# Patient Record
(demographics unavailable — no encounter records)

---

## 2024-11-05 NOTE — HISTORY OF PRESENT ILLNESS
[Pain Location] : pain [Stable] : stable [5] : a current pain level of 5/10 [Walking] : worsened by walking [Rest] : relieved by rest [de-identified] : 63  year old female presents for evaluation of bilateral knee pain , left worse then right She had left knee MRI done which showed severe cartilage wear.  she has long standing b/l knee pain. pt had cortisone injection and they provided a good pain relief. Patient is aware she is a future candidate for knee replacement she is likely pursue left side first  she is interested in repeat cortisone injection in both knees before her trip to [place]

## 2024-11-05 NOTE — DISCUSSION/SUMMARY
[de-identified] : Medication risks reviewed. Surgical risks reviewed. 63 year old female presents with advanced medial compartmental osteoarthritis of both knees.  Patient underwent left knee MRI on Corina 10, 2024 which showed full-thickness cartilage loss . the nature of her condition and treatment options were discussed in detail. The pt is a future candidate for a staged bilateral TKA. We discussed treatment options including surgery. I recommended continuing with conservative treatment at this time. She elected to receive bilateral knee repeat cortisone injection today, which she tolerated well. The pt will f/u in 3 months for repeat injections if needed.  The patient is a 63 year individual with end stage arthritis of their b/l knee joint. Based upon the patient's continued symptoms and failure to respond to conservative treatment (including HA injections, cortisone injections, over the counter medications, and PT) I have recommended a b/l total knee arthroplasty for this patient. A long discussion took place with the patient describing what a total joint replacement is and what the procedure would entail. A total knee arthroplasty model, similar to the implant that was used during the operation, was utilized to demonstrate and to discuss the various bearing surfaces of the implants. The hospitalization and post-operative care and rehabilitation were also discussed. The use of perioperative antibiotics and DVT prophylaxis were discussed. The risk, benefits and alternatives to a surgical intervention were discussed at length with the patient. The patient was also advised of risks related to the medical comorbidities, elevated body mass index (BMI), and smoking where applicable. We discussed how to reduce modifiable risk factors and encouraged smoking cessation were applicable.. A lengthy discussion took place to review the most common complications including but not limited to: deep vein thrombosis, pulmonary embolus, heart attack, stroke, infection, wound breakdown, numbness, damage to nerves, tendon, muscles, arteries or other blood vessels, death and other possible complications from anesthesia. The patient was told that we will take steps to minimize these risks by using sterile technique, antibiotics and DVT prophylaxis when appropriate and follow the patient postoperatively in the office setting to monitor progress. The possibility of recurrent pain, no improvement in pain and actual worsening of pain were also discussed with the patient. The discharge plan of care focused on the patient going home following surgery. The patient was encouraged to make the necessary arrangements to have someone stay with them when they are discharged home. Following discharge, a home care nurse was to the patient. The home care nurse would open the patients home care case and request home physical therapy services. Home physical therapy was to commence following discharge provided it was appropriate and covered by the health insurance benefit plan. The benefits of surgery were discussed with the patient including the potential for improving her current clinical condition through operative intervention. Alternatives to surgical intervention including continued conservative management were also discussed in detail. All questions were answered to the satisfaction of the patient. The treatment plan of care, as well as a model of a total knee arthroplasty equivalent to the one that will be used for their total joint replacement, was shared with the patient. The patient agreed to the plan of care as well as the use of implants in their total joint replacement.

## 2024-11-05 NOTE — PHYSICAL EXAM
[LE] : Sensory: Intact in bilateral lower extremities [ALL] : dorsalis pedis, posterior tibial, femoral, popliteal, and radial 2+ and symmetric bilaterally [Antalgic] : not antalgic [de-identified] : GENERAL APPEARANCE: Well nourished and hydrated, pleasant, alert, and oriented x 3. Appears their stated age. \par  HEENT: Normocephalic, extraocular eye motion intact. Nasal septum midline. Oral cavity clear. External auditory canal clear. \par  RESPIRATORY: Breath sounds clear and audible in all lobes. No wheezing, No accessory muscle use.\par  CARDIOVASCULAR: No apparent abnormalities. No lower leg edema. No varicosities. Pedal pulses are palpable.\par  NEUROLOGIC: Sensation is normal, no muscle weakness in the upper or lower extremities.\par  DERMATOLOGIC: No apparent skin lesions, moist, warm, no rash.\par  SPINE: Cervical spine appears normal and moves freely; thoracic spine appears normal and moves freely; lumbosacral spine appears normal and moves freely, normal, nontender.\par  MUSCULOSKELETAL: Hands, wrists, and elbows are normal and move freely, shoulders are normal and move freely.  [de-identified] : Examination of bilateral knees shows medial joint line tenderness, pain and crepitus with ROM, neutral alignment.  5/5 motor strength in bilateral lower extremities. Sensory: Intact in bilateral lower extremities. DTRs: Biceps, brachioradialis, triceps, patellar, ankle and plantar 2+ and symmetric bilaterally. Pulses: dorsalis pedis, posterior tibial, femoral, popliteal, and radial 2+ and symmetric bilaterally.   b/l hip examination shows preserved ROM without severe pain. she has moderate tenderness and pain with palpation of b/l greater trochanteric bursa.

## 2024-11-05 NOTE — PROCEDURE
[de-identified] : I injected the patient's bilateral knees today with cortisone.\par  \par  I discussed at length with the patient the planned steroid and lidocaine injection. The risks, benefits, convalescence and alternatives were reviewed. The possible side effects discussed included but were not limited to: pain, swelling, heat, bleeding, and redness. Symptoms are generally mild but if they are extensive then contact the office. Giving pain relievers by mouth such as NSAIDs or Tylenol can generally treat the reactions to steroid and lidocaine. Rare cases of infection have been noted. Rash, hives and itching may occur post injection. If you have muscle pain or cramps, flushing and or swelling of the face, rapid heart beat, nausea, dizziness, fever, chills, headache, difficulty breathing, swelling in the arms or legs, or have a prickly feeling of your skin, contact a health care provider immediately. Following this discussion, the knee was prepped with Alcohol and under sterile condition the 80 mg Depo-Medrol and 6 cc Lidocaine injection was performed with a 20 gauge needle through a superolateral injection site. The needle was introduced into the joint, aspiration was performed to ensure intra-articular placement and the medication was injected. Upon withdrawal of the needle the site was cleaned with alcohol and a band aid applied. The patient tolerated the injection well and there were no adverse effects. Post injection instructions included no strenuous activity for 24 hours, cryotherapy and if there are any adverse effects to contact the office.

## 2025-01-29 NOTE — CONSULT LETTER
[Dear  ___] : Dear  [unfilled], [Consult Letter:] : I had the pleasure of evaluating your patient, [unfilled]. [Please see my note below.] : Please see my note below. [Consult Closing:] : Thank you very much for allowing me to participate in the care of this patient.  If you have any questions, please do not hesitate to contact me. [Sincerely,] : Sincerely, [FreeTextEntry3] : Myron Myron I. Kleiner, M.D., FACR   Chief, Division of Rheumatology  Department of Medicine   [facility]   Chief, Division of Rheumatology  Department of Medicine   [facility]

## 2025-01-29 NOTE — CONSULT LETTER
[Dear  ___] : Dear  [unfilled], [Consult Letter:] : I had the pleasure of evaluating your patient, [unfilled]. [Please see my note below.] : Please see my note below. [Consult Closing:] : Thank you very much for allowing me to participate in the care of this patient.  If you have any questions, please do not hesitate to contact me. [Sincerely,] : Sincerely, [FreeTextEntry3] : Myron Myron I. Kleiner, M.D., FACR   Chief, Division of Rheumatology  Department of Medicine   [facility]   Chief, Division of Rheumatology  Department of Medicine   [facility]

## 2025-01-29 NOTE — ADDENDUM
[FreeTextEntry1] :  I, Navdeep Mccurdy, acted solely as a scribe for Dr. Myron I. Kleiner, MD. on 01/28/2025. I personally performed the services described in the documentation, reviewed the documentation recorded by the scribe in my presence, and it accurately and completely records my words and actions.

## 2025-01-29 NOTE — ASSESSMENT
[FreeTextEntry1] : Impression: NITO PARRY is a 63 year old  woman who was referred for further evaluation of joint symptoms and rheumatic diseases.  6 months ago pt developed increased pain b/l shoulders, left wrist, base of both thumbs, and neck with radiation to b/l wrists with  paresthesias.  Denies joint swelling, erythema and heat. Pt denies worse in the morning. Morning stiffness lasting 1 hour. Recent sleep disturbance and fatigue, with snoring, consider fibromyalgia. Pt notes she went to Orthopedic Dr. Valera who performed MRI and treated with Meloxicam 15 mg q.d with some relief and steroid injection to left shoulder with transient relief. Pt was then referred by another pt to see Rheumatologist.  Pt also notes a greater than 10 year history of intermittent pain of b/l knees, dorsa both feet, and arch of both feet. Pt also notes history of low back pain without radiation. Pt on her own would take Aleve 440 mg q.d 3-4x a week with little relief. 9 months ago pt performed MRI of left knee which revealed meniscal tear -- treated by Orthopedic Dr. Somers who performed steroid injection with transient relief. No rash, oral/genital ulcers, alopecia, chest pain, fever, Raynaud's, or other joint symptoms. Pt denies history of dry eye or dry mouth. On PE pt had TMJ left tenderness, tender bilateral bicipital tendons, tender b/l deltoids, anserine bursitis, bicipital tendonitis, deltoid tendonitis, all contributing to her joint pain. Denies recent headache, jaw claudication, or visual symptoms. Pt notes recent cardio exercise via walking for 30 minutes 5 days a week.  Pt notes she performed bone densitometry one year ago which revealed osteopenia, which was treated with Calcium 500mg b.i.d and multivitamin. Pt went through menopause at 52 years old, and denies receiving HRT. Pt notes her mother and sisters have history of osteoarthritis. Pt was referred for further evaluation of joint symptoms and rheumatic diseases. I will evaluate for various types of rheumatic diseases.   Plan: I reviewed patients chart and previous records with extensive discussion Laboratory tests ordered today - see list below - with coordination of care X-rays ordered - see list below - with coordination of care Obtain recent bone densitometry -- with coordination of care Diagnosis and prognosis discussed Continue current medications (other than those changed below)  Etodolac  mg b.i.d. end of breakfast and supper (Possible side effects explained including cardiovascular risk/MI/CVA)  Omeprazole 20 mg q.d. a.c. breakfast (possible side effects explained)   Prophylactic aspirin 81 mg q.d. (end of lunch)  (Possible side effects explained)   Cyclobenzaprine 10 mg one half tab q.d. at supper time; if no better/side effects 7 days, increase to 10 mg q.d. (possible side effects explained)  Daily exercise starting at 10 minutes per day, gradually increasing to at least 30 minutes per day--emphasized  Return visit 2-3 weeks All questions and concerns were addressed

## 2025-01-29 NOTE — REASON FOR VISIT
[Consultation] : a consultation visit [FreeTextEntry1] :  who was referred for further evaluation of joint symptoms and rheumatic diseases.

## 2025-01-29 NOTE — PHYSICAL EXAM
[General Appearance - Alert] : alert [General Appearance - In No Acute Distress] : in no acute distress [General Appearance - Well Nourished] : well nourished [General Appearance - Well-Appearing] : healthy appearing [General Appearance - Well Developed] : well developed [Sclera] : the sclera and conjunctiva were normal [PERRL With Normal Accommodation] : pupils were equal in size, round, and reactive to light [Extraocular Movements] : extraocular movements were intact [Outer Ear] : the ears and nose were normal in appearance [Oropharynx] : the oropharynx was normal [Neck Appearance] : the appearance of the neck was normal [Neck Cervical Mass (___cm)] : no neck mass was observed [Jugular Venous Distention Increased] : there was no jugular-venous distention [Thyroid Diffuse Enlargement] : the thyroid was not enlarged [Lungs Percussion] : the lungs were normal to percussion [Heart Rate And Rhythm] : heart rate was normal and rhythm regular [Edema] : there was no peripheral edema [Abdomen Soft] : soft [Abdomen Tenderness] : non-tender [Abdomen Mass (___ Cm)] : no abdominal mass palpated [Cervical Lymph Nodes Enlarged Posterior Bilaterally] : posterior cervical [Cervical Lymph Nodes Enlarged Anterior Bilaterally] : anterior cervical [Supraclavicular Lymph Nodes Enlarged Bilaterally] : supraclavicular [Axillary Lymph Nodes Enlarged Bilaterally] : axillary [No CVA Tenderness] : no ~M costovertebral angle tenderness [No Spinal Tenderness] : no spinal tenderness [Skin Color & Pigmentation] : normal skin color and pigmentation [Skin Turgor] : normal skin turgor [] : no rash [Cranial Nerves] : cranial nerves 2-12 were intact [Sensation] : the sensory exam was normal to light touch and pinprick [Motor Exam] : the motor exam was normal [No Focal Deficits] : no focal deficits [Oriented To Time, Place, And Person] : oriented to person, place, and time [Impaired Insight] : insight and judgment were intact [Affect] : the affect was normal [Mood] : the mood was normal [FreeTextEntry1] :  Strength-5/5

## 2025-01-29 NOTE — HISTORY OF PRESENT ILLNESS
[FreeTextEntry1] : NITO PARRY is a 63 year old  woman who was referred for further evaluation of joint symptoms and rheumatic diseases.  6 months ago pt developed increased pain b/l shoulders, left wrist, base of both thumbs, and neck with radiation to b/l wrists with  paresthesias.  Denies joint swelling, erythema and heat. Pt denies worse in the morning. Morning stiffness lasting 1 hour. Recent sleep disturbance and fatigue, with snoring. Pt notes she went to Orthopedic Dr. Valera who performed MRI and treated with Meloxicam 15 mg q.d with some relief and steroid injection to left shoulder with transient relief. Pt was then referred by another pt to see Rheumatologist.  Pt also notes a greater than 10 year history of intermittent pain of b/l knees, dorsa both feet, and arch of both feet. Pt also notes history of low back pain without radiation.  Pt on her own would take Aleve 440 mg q.d 3-4x a week with little relief. 9 months ago pt performed MRI of left knee which revealed meniscal tear -- treated by Orthopedic Dr. Somers who performed steroid injection with transient relief. No rash, oral/genital ulcers, alopecia, chest pain, fever, Raynaud's, or other joint symptoms. Pt denies history of dry eye or dry mouth. Recent cardio exercise via walking for 30 minutes 5 days a week. Pt notes she performed bone densitometry one year ago which revealed osteopenia, which was treated with Calcium 500mg b.i.d and multivitamin. Pt went through menopause at 52 years old, and denies receiving HRT. Pt notes her mother and sisters have history of osteoarthritis. Pt was referred for further evaluation of joint symptoms and rheumatic diseases.

## 2025-02-21 NOTE — PROCEDURE
[de-identified] : I injected the patient's bilateral knees today with cortisone.\par  \par  I discussed at length with the patient the planned steroid and lidocaine injection. The risks, benefits, convalescence and alternatives were reviewed. The possible side effects discussed included but were not limited to: pain, swelling, heat, bleeding, and redness. Symptoms are generally mild but if they are extensive then contact the office. Giving pain relievers by mouth such as NSAIDs or Tylenol can generally treat the reactions to steroid and lidocaine. Rare cases of infection have been noted. Rash, hives and itching may occur post injection. If you have muscle pain or cramps, flushing and or swelling of the face, rapid heart beat, nausea, dizziness, fever, chills, headache, difficulty breathing, swelling in the arms or legs, or have a prickly feeling of your skin, contact a health care provider immediately. Following this discussion, the knee was prepped with Alcohol and under sterile condition the 80 mg Depo-Medrol and 6 cc Lidocaine injection was performed with a 20 gauge needle through a superolateral injection site. The needle was introduced into the joint, aspiration was performed to ensure intra-articular placement and the medication was injected. Upon withdrawal of the needle the site was cleaned with alcohol and a band aid applied. The patient tolerated the injection well and there were no adverse effects. Post injection instructions included no strenuous activity for 24 hours, cryotherapy and if there are any adverse effects to contact the office.

## 2025-02-21 NOTE — DISCUSSION/SUMMARY
[de-identified] : Medication risks reviewed. Surgical risks reviewed. 63 year old female presents with advanced medial compartmental osteoarthritis of both knees.  Patient underwent left knee MRI on Corina 10, 2024 which showed full-thickness cartilage loss . the nature of her condition and treatment options were discussed in detail. The pt is a future candidate for a staged bilateral TKA. We discussed treatment options including surgery. I recommended continuing with conservative treatment at this time. She elected to receive bilateral knee repeat cortisone injection today, which she tolerated well. The pt will f/u in 3 months for repeat injections if needed.  The patient is a 63 year individual with end stage arthritis of their b/l knee joint. Based upon the patient's continued symptoms and failure to respond to conservative treatment (including HA injections, cortisone injections, over the counter medications, and PT) I have recommended a b/l total knee arthroplasty for this patient. A long discussion took place with the patient describing what a total joint replacement is and what the procedure would entail. A total knee arthroplasty model, similar to the implant that was used during the operation, was utilized to demonstrate and to discuss the various bearing surfaces of the implants. The hospitalization and post-operative care and rehabilitation were also discussed. The use of perioperative antibiotics and DVT prophylaxis were discussed. The risk, benefits and alternatives to a surgical intervention were discussed at length with the patient. The patient was also advised of risks related to the medical comorbidities, elevated body mass index (BMI), and smoking where applicable. We discussed how to reduce modifiable risk factors and encouraged smoking cessation were applicable.. A lengthy discussion took place to review the most common complications including but not limited to: deep vein thrombosis, pulmonary embolus, heart attack, stroke, infection, wound breakdown, numbness, damage to nerves, tendon, muscles, arteries or other blood vessels, death and other possible complications from anesthesia. The patient was told that we will take steps to minimize these risks by using sterile technique, antibiotics and DVT prophylaxis when appropriate and follow the patient postoperatively in the office setting to monitor progress. The possibility of recurrent pain, no improvement in pain and actual worsening of pain were also discussed with the patient. The discharge plan of care focused on the patient going home following surgery. The patient was encouraged to make the necessary arrangements to have someone stay with them when they are discharged home. Following discharge, a home care nurse was to the patient. The home care nurse would open the patients home care case and request home physical therapy services. Home physical therapy was to commence following discharge provided it was appropriate and covered by the health insurance benefit plan. The benefits of surgery were discussed with the patient including the potential for improving her current clinical condition through operative intervention. Alternatives to surgical intervention including continued conservative management were also discussed in detail. All questions were answered to the satisfaction of the patient. The treatment plan of care, as well as a model of a total knee arthroplasty equivalent to the one that will be used for their total joint replacement, was shared with the patient. The patient agreed to the plan of care as well as the use of implants in their total joint replacement.

## 2025-02-21 NOTE — HISTORY OF PRESENT ILLNESS
[Pain Location] : pain [Stable] : stable [5] : a current pain level of 5/10 [Walking] : worsened by walking [Rest] : relieved by rest [de-identified] : 63  year old female presents for evaluation of bilateral knee pain , left worse then right She had left knee MRI done which showed severe cartilage wear.  she has long standing b/l knee pain. pt had cortisone injection and they provided a good pain relief. She comes in today for repeat bilateral cortisone injections and reports she did well with her last set.

## 2025-02-21 NOTE — PHYSICAL EXAM
[LE] : Sensory: Intact in bilateral lower extremities [ALL] : dorsalis pedis, posterior tibial, femoral, popliteal, and radial 2+ and symmetric bilaterally [Antalgic] : not antalgic [de-identified] : GENERAL APPEARANCE: Well nourished and hydrated, pleasant, alert, and oriented x 3. Appears their stated age. \par  HEENT: Normocephalic, extraocular eye motion intact. Nasal septum midline. Oral cavity clear. External auditory canal clear. \par  RESPIRATORY: Breath sounds clear and audible in all lobes. No wheezing, No accessory muscle use.\par  CARDIOVASCULAR: No apparent abnormalities. No lower leg edema. No varicosities. Pedal pulses are palpable.\par  NEUROLOGIC: Sensation is normal, no muscle weakness in the upper or lower extremities.\par  DERMATOLOGIC: No apparent skin lesions, moist, warm, no rash.\par  SPINE: Cervical spine appears normal and moves freely; thoracic spine appears normal and moves freely; lumbosacral spine appears normal and moves freely, normal, nontender.\par  MUSCULOSKELETAL: Hands, wrists, and elbows are normal and move freely, shoulders are normal and move freely.  [de-identified] : Examination of bilateral knees shows medial joint line tenderness, pain and crepitus with ROM, neutral alignment.  5/5 motor strength in bilateral lower extremities. Sensory: Intact in bilateral lower extremities. DTRs: Biceps, brachioradialis, triceps, patellar, ankle and plantar 2+ and symmetric bilaterally. Pulses: dorsalis pedis, posterior tibial, femoral, popliteal, and radial 2+ and symmetric bilaterally.   b/l hip examination shows preserved ROM without severe pain. she has moderate tenderness and pain with palpation of b/l greater trochanteric bursa.

## 2025-04-24 NOTE — REASON FOR VISIT
[Follow-Up Visit] : a follow-up visit for [Knee Pain] : knee pain [Other: ____] : [unfilled] [FreeTextEntry2] : bilateral knee pain

## 2025-04-24 NOTE — PHYSICAL EXAM
[LE] : Sensory: Intact in bilateral lower extremities [ALL] : dorsalis pedis, posterior tibial, femoral, popliteal, and radial 2+ and symmetric bilaterally [Antalgic] : not antalgic [de-identified] : GENERAL APPEARANCE: Well nourished and hydrated, pleasant, alert, and oriented x 3. Appears their stated age. \par  HEENT: Normocephalic, extraocular eye motion intact. Nasal septum midline. Oral cavity clear. External auditory canal clear. \par  RESPIRATORY: Breath sounds clear and audible in all lobes. No wheezing, No accessory muscle use.\par  CARDIOVASCULAR: No apparent abnormalities. No lower leg edema. No varicosities. Pedal pulses are palpable.\par  NEUROLOGIC: Sensation is normal, no muscle weakness in the upper or lower extremities.\par  DERMATOLOGIC: No apparent skin lesions, moist, warm, no rash.\par  SPINE: Cervical spine appears normal and moves freely; thoracic spine appears normal and moves freely; lumbosacral spine appears normal and moves freely, normal, nontender.\par  MUSCULOSKELETAL: Hands, wrists, and elbows are normal and move freely, shoulders are normal and move freely.  [de-identified] : Examination of right knees shows medial joint line tenderness, pain and crepitus with ROM, neutral alignment.  5/5 motor strength in bilateral lower extremities. Sensory: Intact in bilateral lower extremities. DTRs: Biceps, brachioradialis, triceps, patellar, ankle and plantar 2+ and symmetric bilaterally. Pulses: dorsalis pedis, posterior tibial, femoral, popliteal, and radial 2+ and symmetric bilaterally.   b/l hip examination shows preserved ROM without severe pain. she has moderate tenderness and pain with palpation of b/l greater trochanteric bursa.   [de-identified] : 4 view imaging of the right knee shows advanced tricompartmental osteoarthritis bone-on-bone in the medial compartment

## 2025-04-24 NOTE — HISTORY OF PRESENT ILLNESS
[Pain Location] : pain [Stable] : stable [5] : a current pain level of 5/10 [Walking] : worsened by walking [Rest] : relieved by rest [de-identified] : 63  year old female presents for follow-up of knee pain today reporting acutely worsening right knee pain.  She does have history of bilateral advanced knee osteoarthritis.  She was last seen 6 weeks ago had bilateral cortisone injection she reports some relief on the left but no relief in the right knee.  Over the past week she has had acutely worsening medial right knee pain worse with ambulation stairs and exercise.  She reports at this point the pain in the right knee is severe even when sitting.  She has been taking anti-inflammatory and icing the knee with no relief

## 2025-04-24 NOTE — DISCUSSION/SUMMARY
[de-identified] : Medication risks reviewed. Surgical risks reviewed. 63 year old female presents with acutely worsening right knee pain over the past week.  She does have history of advanced osteoarthritis of the bilateral knees.  She did have cortisone injection in the bilateral knees 6 weeks ago and reports no relief in the right knee.  I have ordered an MRI to rule out stress fracture of the right knee.  We did discuss that at this point she has exhausted conservative treatment including cortisone injections gel injections physical therapy and anti-inflammatory medications for pain.  We will discuss MRI results but ultimately she is a candidate for right knee replacement.  She will call after MRI is done to review  The patient is a 63 year individual with end stage arthritis of their b/l knee joint. Based upon the patient's continued symptoms and failure to respond to conservative treatment (including HA injections, cortisone injections, over the counter medications, and PT) I have recommended a b/l total knee arthroplasty for this patient. A long discussion took place with the patient describing what a total joint replacement is and what the procedure would entail. A total knee arthroplasty model, similar to the implant that was used during the operation, was utilized to demonstrate and to discuss the various bearing surfaces of the implants. The hospitalization and post-operative care and rehabilitation were also discussed. The use of perioperative antibiotics and DVT prophylaxis were discussed. The risk, benefits and alternatives to a surgical intervention were discussed at length with the patient. The patient was also advised of risks related to the medical comorbidities, elevated body mass index (BMI), and smoking where applicable. We discussed how to reduce modifiable risk factors and encouraged smoking cessation were applicable.. A lengthy discussion took place to review the most common complications including but not limited to: deep vein thrombosis, pulmonary embolus, heart attack, stroke, infection, wound breakdown, numbness, damage to nerves, tendon, muscles, arteries or other blood vessels, death and other possible complications from anesthesia. The patient was told that we will take steps to minimize these risks by using sterile technique, antibiotics and DVT prophylaxis when appropriate and follow the patient postoperatively in the office setting to monitor progress. The possibility of recurrent pain, no improvement in pain and actual worsening of pain were also discussed with the patient. The discharge plan of care focused on the patient going home following surgery. The patient was encouraged to make the necessary arrangements to have someone stay with them when they are discharged home. Following discharge, a home care nurse was to the patient. The home care nurse would open the patients home care case and request home physical therapy services. Home physical therapy was to commence following discharge provided it was appropriate and covered by the health insurance benefit plan. The benefits of surgery were discussed with the patient including the potential for improving her current clinical condition through operative intervention. Alternatives to surgical intervention including continued conservative management were also discussed in detail. All questions were answered to the satisfaction of the patient. The treatment plan of care, as well as a model of a total knee arthroplasty equivalent to the one that will be used for their total joint replacement, was shared with the patient. The patient agreed to the plan of care as well as the use of implants in their total joint replacement.

## 2025-05-04 NOTE — HISTORY OF PRESENT ILLNESS
[FreeTextEntry1] :  NITO PARRY is a 63 year old woman who presents for initial follow up for further evaluation of joint symptoms and rheumatic diseases.  Persistent low back pain without radiation. Pain bilateral knee, base of left thumb and left wrist. Past 2 weeks increased pain right knee. Denies recent sleep disturbance and fatigue. Orthopedics Dr. Somers steroid injection bilateral knees q.3 month (last 2 months) with some relief. Denies recent dry eyes and dry mouth. Applies Diclofenac gel b.i.d. bilateral knees and back. Alopecia with thinning denies clumps. Pt did not start cyclobenzaprine Secondary to never receiving it. Patient denies rash or side effects with current medications.

## 2025-05-04 NOTE — ASSESSMENT
[FreeTextEntry1] : Impression:  NITO PARRY is a 63 year old woman who presents for initial follow up for further evaluation of joint symptoms and rheumatic diseases including osteoarthritis, history of osteopenia, fibromyalgia, chronic low back pain, left de quervain's tenosynovitis   Persistent low back pain without radiation Secondary to osteoarthritis and chronic low back pain. Pain bilateral knee, base of left thumb and left wrist Secondary to osteoarthritis and left de Quervain's tenosynovitis. Past 2 weeks increased pain right knee. Denies recent sleep disturbance and fatigue with her fibromyalgia quiescent. Orthopedics Dr. Castellanost steroid injection bilateral knees q.3 month (last 2 months) with some relief. Etodolac is not giving the pt adequate relief. From previous physical exam bilateral anserine bursitis -- resolved. On exam pt has left bicipital tendonitis, left deltoids tendonitis, tenderness/crepitus b/l 1st CMC joints, left de quervain's tenosynovitis contributing to her joint pain. Denies recent dry eyes and dry mouth - on exam pt has dry eyes and tongue slightly moist - I will continue to monitor for Sjogren Syndrome. Applies Diclofenac gel b.i.d. bilateral knees and back. Alopecia with thinning denies clumps. Pt did not start cyclobenzaprine Secondary to never receiving it. Recent lab tests results revealed  (not fasted), platelets clumped although adequate on smear, otherwise unrevealing-- with extensive discussion. Recent xray results revealed osteoarthritis various IP joints, .bl shoulders, cervical spine, left wrist, LS spine, post surgical changes left lung, 4 mm nodule left mild lung, straightening of the cervical lordosis -- with extensive discussion. Recent Chest CAT scan results revealed 0.5 cm ground glass nodule MARISA -- with extensive discussion.  Previous MRI left knee of June 2024 ordered by orthopedics revealed osteoarthritis, meniscal tear, small effusion, Baker's cyst.  Orthopedics has determined that she is a candidate for bilateral total knee arthroplasty in view of lack of relief with conservative treatments.  Patient denies rash or side effects with current medications.   Plan: I reviewed patients chart and previous records with extensive discussion Lab tests results reviewed with the patient with extensive discussion X-rays results reviewed with the patient with extensive discussion I reviewed recent Chest CAT scan results with the patient -- with extensive discussion I reviewed results of MRI left knee of June 2024 Laboratory tests ordered today - see list below - with coordination of care Obtain previous bone densitometry report including raw data from [facility] -- with extensive discussion Diagnosis and prognosis discussed Continue current medications (other than those changed below) Discontinue Etodolac  Flurbiprofen 100 mg b.i.d end of breakfast and supper (Possible side effects explained including cardiovascular risk/MI/CVA) Prophylactic aspirin 81 mg q.d. end of lunch (Possible side effects explained)  I am reordering Cyclobenzaprine 10 mg one half tab q.d. at supper time; if no better/side effects 7 days, increase to 10 mg q.d. (possible side effects explained) Continue exercise daily for at least 30 minutes per day--emphasized  Bilateral knee exercises--instruction sheet given and discussed - exercise demonstrated with extensive discussion   Back exercises--instruction sheet given and discussed   Pulmonary consultation regarding Pulmonary nodule -- with coordination of care  --[facility]--please send me a consult report -- she already has appointment in May  PCP f/u regarding hyperlipidemia -- with coordination of care Continue orthopedic follow-up Return visit 3 months  All questions and concerns were addressed  Total time for this office visit, including face-to-face time and non-face-to-face time, 102 minutes--- including review of the chart and previous records, detailed review of her extensive medical history, review of previous lab results with extensive discussion with the patient, ordering lab tests with coordination of care, review of recent imaging reports/x-ray results with extensive discussion with the patient, reviewed her recent chest CAT scan results with extensive discussion with the patient, review of her previous MRI left knee, obtaining her previous bone densitometry report with raw data for my review with coordination of care, detailed medication history, review of medications going forward with their possible side effects, ordered back exercises and provided her with instruction sheet with extensive discussion, ordered bilateral knee exercises providing her with instruction sheet and I demonstrated the exercises for her with extensive discussion, ordered pulmonary consultation with coordination of care, urged PCP follow-up regarding her hyperlipidemia with extensive discussion, reviewed the impact of the patient's rheumatic disease on their other medical problems, reviewed the impact of the patient's other medical problems on their rheumatic disease

## 2025-05-04 NOTE — ADDENDUM
[FreeTextEntry1] :  I, Rayshawn Chaudhary, acted solely as a scribe for Dr. Myron I. Kleiner, MD. on 04/30/2025. I personally performed the services described in the documentation, reviewed the documentation recorded by the scribe in my presence, and it accurately and completely records my words and actions.

## 2025-05-04 NOTE — CONSULT LETTER
[Dear  ___] : Dear  [unfilled], [Consult Letter:] : I had the pleasure of evaluating your patient, [unfilled]. [Please see my note below.] : Please see my note below. [Consult Closing:] : Thank you very much for allowing me to participate in the care of this patient.  If you have any questions, please do not hesitate to contact me. [Sincerely,] : Sincerely, [FreeTextEntry3] : Myron Myron I. Kleiner, M.D., FACR   Chief, Division of Rheumatology  Department of Medicine   [facility]   Chief, Division of Rheumatology  Department of Medicine   [facility]

## 2025-05-04 NOTE — ASSESSMENT
[FreeTextEntry1] : Impression:  NITO PARRY is a 63 year old woman who presents for initial follow up for further evaluation of joint symptoms and rheumatic diseases including osteoarthritis, history of osteopenia, fibromyalgia, chronic low back pain, left de quervain's tenosynovitis   Persistent low back pain without radiation Secondary to osteoarthritis and chronic low back pain. Pain bilateral knee, base of left thumb and left wrist Secondary to osteoarthritis and left de Quervain's tenosynovitis. Past 2 weeks increased pain right knee. Denies recent sleep disturbance and fatigue with her fibromyalgia quiescent. Orthopedics Dr. Castellanost steroid injection bilateral knees q.3 month (last 2 months) with some relief. Etodolac is not giving the pt adequate relief. From previous physical exam bilateral anserine bursitis -- resolved. On exam pt has left bicipital tendonitis, left deltoids tendonitis, tenderness/crepitus b/l 1st CMC joints, left de quervain's tenosynovitis contributing to her joint pain. Denies recent dry eyes and dry mouth - on exam pt has dry eyes and tongue slightly moist - I will continue to monitor for Sjogren Syndrome. Applies Diclofenac gel b.i.d. bilateral knees and back. Alopecia with thinning denies clumps. Pt did not start cyclobenzaprine Secondary to never receiving it. Recent lab tests results revealed  (not fasted), platelets clumped although adequate on smear, otherwise unrevealing-- with extensive discussion. Recent xray results revealed osteoarthritis various IP joints, .bl shoulders, cervical spine, left wrist, LS spine, post surgical changes left lung, 4 mm nodule left mild lung, straightening of the cervical lordosis -- with extensive discussion. Recent Chest CAT scan results revealed 0.5 cm ground glass nodule MARISA -- with extensive discussion.  Previous MRI left knee of June 2024 ordered by orthopedics revealed osteoarthritis, meniscal tear, small effusion, Baker's cyst.  Orthopedics has determined that she is a candidate for bilateral total knee arthroplasty in view of lack of relief with conservative treatments.  Patient denies rash or side effects with current medications.   Plan: I reviewed patients chart and previous records with extensive discussion Lab tests results reviewed with the patient with extensive discussion X-rays results reviewed with the patient with extensive discussion I reviewed recent Chest CAT scan results with the patient -- with extensive discussion I reviewed results of MRI left knee of June 2024 Laboratory tests ordered today - see list below - with coordination of care Obtain previous bone densitometry report including raw data from [facility] -- with extensive discussion Diagnosis and prognosis discussed Continue current medications (other than those changed below) Discontinue Etodolac  Flurbiprofen 100 mg b.i.d end of breakfast and supper (Possible side effects explained including cardiovascular risk/MI/CVA) Prophylactic aspirin 81 mg q.d. end of lunch (Possible side effects explained)  I am reordering Cyclobenzaprine 10 mg one half tab q.d. at supper time; if no better/side effects 7 days, increase to 10 mg q.d. (possible side effects explained) Continue exercise daily for at least 30 minutes per day--emphasized  Bilateral knee exercises--instruction sheet given and discussed - exercise demonstrated with extensive discussion   Back exercises--instruction sheet given and discussed   Pulmonary consultation regarding Pulmonary nodule -- with coordination of care  --[facility]--please send me a consult report -- she already has appointment in May  PCP f/u regarding hyperlipidemia -- with coordination of care Continue orthopedic follow-up Return visit 3 months  All questions and concerns were addressed  Total time for this office visit, including face-to-face time and non-face-to-face time, 102 minutes--- including review of the chart and previous records, detailed review of her extensive medical history, review of previous lab results with extensive discussion with the patient, ordering lab tests with coordination of care, review of recent imaging reports/x-ray results with extensive discussion with the patient, reviewed her recent chest CAT scan results with extensive discussion with the patient, review of her previous MRI left knee, obtaining her previous bone densitometry report with raw data for my review with coordination of care, detailed medication history, review of medications going forward with their possible side effects, ordered back exercises and provided her with instruction sheet with extensive discussion, ordered bilateral knee exercises providing her with instruction sheet and I demonstrated the exercises for her with extensive discussion, ordered pulmonary consultation with coordination of care, urged PCP follow-up regarding her hyperlipidemia with extensive discussion, reviewed the impact of the patient's rheumatic disease on their other medical problems, reviewed the impact of the patient's other medical problems on their rheumatic disease

## 2025-05-04 NOTE — CONSULT LETTER
[Dear  ___] : Dear  [unfilled], [Consult Letter:] : I had the pleasure of evaluating your patient, [unfilled]. [Please see my note below.] : Please see my note below. [Consult Closing:] : Thank you very much for allowing me to participate in the care of this patient.  If you have any questions, please do not hesitate to contact me. [Sincerely,] : Sincerely, [FreeTextEntry3] : Myron Myron I. Kleiner, M.D., FACR   Chief, Division of Rheumatology  Department of Medicine   [facility]   Chief, Division of Rheumatology  Department of Medicine   [facility]

## 2025-05-04 NOTE — REVIEW OF SYSTEMS
[As Noted in HPI] : as noted in HPI [Negative] : Heme/Lymph [Dry Eyes] : dryness of the eyes [Feeling Tired] : not feeling tired

## 2025-05-28 NOTE — PROCEDURE
[de-identified] : I injected the patient's bilateral knee today with cortisone for primary osteoarthritis. I discussed at length with the patient the planned steroid and lidocaine injection. The risks, benefits, convalescence and alternatives were reviewed. The possible side effects discussed included but were not limited to: pain, swelling, heat, bleeding, and redness. Symptoms are generally mild but if they are extensive then contact the office. Giving pain relievers by mouth such as NSAIDs or Tylenol can generally treat the reactions to steroid and lidocaine. Rare cases of infection have been noted. Rash, hives and itching may occur post injection. If you have muscle pain or cramps, flushing and or swelling of the face, rapid heart beat, nausea, dizziness, fever, chills, headache, difficulty breathing, swelling in the arms or legs, or have a prickly feeling of your skin, contact a health care provider immediately. Following this discussion, the knee was prepped with Alcohol and under sterile condition the 80 mg Depo-Medrol and 6 cc Lidocaine injection was performed with a 20 gauge needle through a superolateral injection site. The needle was introduced into the joint, aspiration was performed to ensure intra-articular placement and the medication was injected. Upon withdrawal of the needle the site was cleaned with alcohol and a band aid applied. The patient tolerated the injection well and there were no adverse effects. Post injection instructions included no strenuous activity for 24 hours, cryotherapy and if there are any adverse effects to contact the office.

## 2025-05-28 NOTE — DISCUSSION/SUMMARY
[de-identified] : Medication risks reviewed. Surgical risks reviewed.  Patient 63-year-old female comes in today for follow-up of bilateral knees.  She did have recent MRI of the right knee which showed right knee stress fracture.  She is going to proceed with right knee replacement first in the fall.  She did offer repeat cortisone injection in the office today follow-up 3 months she did speak with the surgical coordinator to discuss surgery in August. we did discuss that at this point she has exhausted conservative treatment including cortisone injections gel injections physical therapy and anti-inflammatory medications for pain. The patient is a 63 year individual with end stage arthritis of their b/l knee joint. Based upon the patient's continued symptoms and failure to respond to conservative treatment (including HA injections, cortisone injections, over the counter medications, and PT) I have recommended a b/l total knee arthroplasty for this patient. A long discussion took place with the patient describing what a total joint replacement is and what the procedure would entail. A total knee arthroplasty model, similar to the implant that was used during the operation, was utilized to demonstrate and to discuss the various bearing surfaces of the implants. The hospitalization and post-operative care and rehabilitation were also discussed. The use of perioperative antibiotics and DVT prophylaxis were discussed. The risk, benefits and alternatives to a surgical intervention were discussed at length with the patient. The patient was also advised of risks related to the medical comorbidities, elevated body mass index (BMI), and smoking where applicable. We discussed how to reduce modifiable risk factors and encouraged smoking cessation were applicable.. A lengthy discussion took place to review the most common complications including but not limited to: deep vein thrombosis, pulmonary embolus, heart attack, stroke, infection, wound breakdown, numbness, damage to nerves, tendon, muscles, arteries or other blood vessels, death and other possible complications from anesthesia. The patient was told that we will take steps to minimize these risks by using sterile technique, antibiotics and DVT prophylaxis when appropriate and follow the patient postoperatively in the office setting to monitor progress. The possibility of recurrent pain, no improvement in pain and actual worsening of pain were also discussed with the patient. The discharge plan of care focused on the patient going home following surgery. The patient was encouraged to make the necessary arrangements to have someone stay with them when they are discharged home. Following discharge, a home care nurse was to the patient. The home care nurse would open the patients home care case and request home physical therapy services. Home physical therapy was to commence following discharge provided it was appropriate and covered by the health insurance benefit plan. The benefits of surgery were discussed with the patient including the potential for improving her current clinical condition through operative intervention. Alternatives to surgical intervention including continued conservative management were also discussed in detail. All questions were answered to the satisfaction of the patient. The treatment plan of care, as well as a model of a total knee arthroplasty equivalent to the one that will be used for their total joint replacement, was shared with the patient. The patient agreed to the plan of care as well as the use of implants in their total joint replacement.

## 2025-05-28 NOTE — PHYSICAL EXAM
[LE] : Sensory: Intact in bilateral lower extremities [ALL] : dorsalis pedis, posterior tibial, femoral, popliteal, and radial 2+ and symmetric bilaterally [Antalgic] : not antalgic [de-identified] : GENERAL APPEARANCE: Well nourished and hydrated, pleasant, alert, and oriented x 3. Appears their stated age. \par  HEENT: Normocephalic, extraocular eye motion intact. Nasal septum midline. Oral cavity clear. External auditory canal clear. \par  RESPIRATORY: Breath sounds clear and audible in all lobes. No wheezing, No accessory muscle use.\par  CARDIOVASCULAR: No apparent abnormalities. No lower leg edema. No varicosities. Pedal pulses are palpable.\par  NEUROLOGIC: Sensation is normal, no muscle weakness in the upper or lower extremities.\par  DERMATOLOGIC: No apparent skin lesions, moist, warm, no rash.\par  SPINE: Cervical spine appears normal and moves freely; thoracic spine appears normal and moves freely; lumbosacral spine appears normal and moves freely, normal, nontender.\par  MUSCULOSKELETAL: Hands, wrists, and elbows are normal and move freely, shoulders are normal and move freely.  [de-identified] : Examination of right knees shows medial joint line tenderness, pain and crepitus with ROM, neutral alignment.  5/5 motor strength in bilateral lower extremities. Sensory: Intact in bilateral lower extremities. DTRs: Biceps, brachioradialis, triceps, patellar, ankle and plantar 2+ and symmetric bilaterally. Pulses: dorsalis pedis, posterior tibial, femoral, popliteal, and radial 2+ and symmetric bilaterally.   b/l hip examination shows preserved ROM without severe pain. she has moderate tenderness and pain with palpation of b/l greater trochanteric bursa.   [de-identified] : 4 view imaging of the right knee shows advanced tricompartmental osteoarthritis bone-on-bone in the medial compartment

## 2025-05-28 NOTE — REASON FOR VISIT
[Follow-Up Visit] : a follow-up visit for [Other: ____] : [unfilled] [Knee Pain] : knee pain [FreeTextEntry2] : bilateral knee pain

## 2025-05-28 NOTE — HISTORY OF PRESENT ILLNESS
[Pain Location] : pain [Stable] : stable [5] : a current pain level of 5/10 [Walking] : worsened by walking [Rest] : relieved by rest [de-identified] : 63  year old female presents for follow-up of bilateral knee pain.  She does have history of bilateral advanced knee osteoarthritis.  She had bilateral cortisone injections in February.  She has been having acutely worsening right medial knee pain and did have an MRI which showed stress fracture of the medial tibial plateau.  She does continue to have bilateral knee pain right greater than left.  She reports pain is worse with ambulation stairs and exercise.  This pain is affecting her activities of daily living.  She would like to proceed with right knee replacement in the fall